# Patient Record
Sex: MALE | Race: BLACK OR AFRICAN AMERICAN | Employment: FULL TIME | URBAN - METROPOLITAN AREA
[De-identification: names, ages, dates, MRNs, and addresses within clinical notes are randomized per-mention and may not be internally consistent; named-entity substitution may affect disease eponyms.]

---

## 2021-09-27 ENCOUNTER — APPOINTMENT (OUTPATIENT)
Dept: GENERAL RADIOLOGY | Age: 46
End: 2021-09-27
Attending: EMERGENCY MEDICINE

## 2021-09-27 ENCOUNTER — HOSPITAL ENCOUNTER (EMERGENCY)
Age: 46
Discharge: HOME | End: 2021-09-27
Attending: EMERGENCY MEDICINE

## 2021-09-27 VITALS
OXYGEN SATURATION: 98 % | TEMPERATURE: 98.1 F | DIASTOLIC BLOOD PRESSURE: 88 MMHG | RESPIRATION RATE: 18 BRPM | HEART RATE: 88 BPM | SYSTOLIC BLOOD PRESSURE: 147 MMHG

## 2021-09-27 DIAGNOSIS — F17.200 SMOKING ADDICTION: ICD-10-CM

## 2021-09-27 DIAGNOSIS — J45.21 MILD INTERMITTENT ASTHMA WITH ACUTE EXACERBATION: Primary | ICD-10-CM

## 2021-09-27 PROCEDURE — 74011000250 HC RX REV CODE- 250: Performed by: EMERGENCY MEDICINE

## 2021-09-27 PROCEDURE — 94640 AIRWAY INHALATION TREATMENT: CPT

## 2021-09-27 PROCEDURE — 71046 X-RAY EXAM CHEST 2 VIEWS: CPT

## 2021-09-27 PROCEDURE — 99282 EMERGENCY DEPT VISIT SF MDM: CPT

## 2021-09-27 RX ORDER — PREDNISONE 10 MG/1
TABLET ORAL
Qty: 21 TABLET | Refills: 0 | Status: SHIPPED | OUTPATIENT
Start: 2021-09-27

## 2021-09-27 RX ORDER — IPRATROPIUM BROMIDE AND ALBUTEROL SULFATE 2.5; .5 MG/3ML; MG/3ML
3 SOLUTION RESPIRATORY (INHALATION)
Qty: 30 NEBULE | Refills: 0 | Status: SHIPPED | OUTPATIENT
Start: 2021-09-27

## 2021-09-27 RX ORDER — ALBUTEROL SULFATE 0.83 MG/ML
5 SOLUTION RESPIRATORY (INHALATION)
Status: COMPLETED | OUTPATIENT
Start: 2021-09-27 | End: 2021-09-27

## 2021-09-27 RX ORDER — ALBUTEROL SULFATE 90 UG/1
2 AEROSOL, METERED RESPIRATORY (INHALATION)
Qty: 1 EACH | Refills: 0 | Status: SHIPPED | OUTPATIENT
Start: 2021-09-27

## 2021-09-27 RX ADMIN — ALBUTEROL SULFATE 5 MG: 2.5 SOLUTION RESPIRATORY (INHALATION) at 04:54

## 2021-09-27 NOTE — Clinical Note
[address]  \[facility] [place] [facility]\"" EMERGENCY DEPT  [address] [facility]  [place] [id number] [phone]    Work/School Note    Date: 9/27/2021    To Whom It May concern:    China Blake was seen and treated today in the emergency room by the following provider(s):  Attending Provider: Unique Fisher MD.      China Blake is excused from work/school on 09/27/21 and 09/28/21. He is medically clear to return to work/school on 9/29/2021.        Sincerely,          Alvin Lal MD

## 2021-09-27 NOTE — ED PROVIDER NOTES
EMERGENCY DEPARTMENT HISTORY AND PHYSICAL EXAM      Date: 9/27/2021  Patient Name: Shaila Murphy    History of Presenting Illness     Chief Complaint   Patient presents with    Wheezing     reports hx of asthma, woke with wheezing. did 2 nebs prior to EMS arrival, had 1 duoneb and 10mg dexamethasone by EMS. reports feeling better. History Provided By: Patient    HPI: Shaila Murphy, 39 y.o. male with PMHx significant for asthma presents to the ED with chief complaint of shortness of breath that woke him up from sleep at about 12:30 AM.  Patient uses Advair, Singulair and has duo nebs at home to help with his asthma. When he woke up this morning he did 2 duo nebs and then laid back down. He felt a little better initially, then got short of breath again. He did a third neb and then decided to call EMS. Denies any chest pain, abdominal pain, nausea, vomiting, diarrhea. Denies any fevers, chills, sore throat or runny nose. He does report a cough that is nonproductive. Patient admits to smoking 3 to 4 cigarettes a day and drinking alcohol occasionally. He denies any drug use. He has had both of his Covid shots Beba Marlin) in June 2021. EMS was called and he received dexamethasone 10 mg and 1 DuoNeb in route. He is feeling better now. PCP: Baldo, MD Alina    No current facility-administered medications on file prior to encounter. Current Outpatient Medications on File Prior to Encounter   Medication Sig Dispense Refill    predniSONE (DELTASONE) 50 mg tablet Take 1 Tab by mouth daily. 3 Tab 0    montelukast (SINGULAIR) 10 mg tablet Take 10 mg by mouth nightly.  mometasone-formoterol (DULERA) 200-5 mcg/actuation HFA inhaler Take 2 Puffs by inhalation two (2) times a day.  albuterol-ipratropium (DUONEB) 2.5 mg-0.5 mg/3 ml nebu 3 mL by Nebulization route every four (4) hours as needed.       albuterol (PROVENTIL HFA, VENTOLIN HFA, PROAIR HFA) 90 mcg/actuation inhaler Take 2 puffs by inhalation every four (4) hours as needed for Wheezing. 1 Inhaler 1       Past History     Past Medical History:  Past Medical History:   Diagnosis Date    Asthma     childhood; bipap; no prior intubation       Past Surgical History:  Past Surgical History:   Procedure Laterality Date    HX APPENDECTOMY      age 15-12       Family History:  Family History   Problem Relation Age of Onset   [facility] Asthma Mother     Kidney Disease Mother        Social History:  Social History     Tobacco Use    Smoking status: Current Some Day Smoker     Packs/day: 0.25    Tobacco comment: trying to quit for past 4 months   Substance Use Topics    Alcohol use: Yes     Alcohol/week: 1.0 standard drinks     Types: 1 Cans of beer per week     Comment: 12 to 24 oz beer daily    Drug use: No       Allergies:  No Known Allergies      Review of Systems   Review of Systems   Constitutional: Negative for chills and fever. HENT: Positive for rhinorrhea and sore throat. Negative for congestion and ear pain. Eyes: Negative for visual disturbance. Respiratory: Positive for cough, chest tightness, shortness of breath and wheezing. Cardiovascular: Negative for chest pain and palpitations. Gastrointestinal: Negative for abdominal pain, diarrhea, nausea and vomiting. Genitourinary: Negative for dysuria, flank pain and hematuria. Musculoskeletal: Negative for back pain, myalgias and neck pain. Skin: Negative for rash and wound. Neurological: Negative for dizziness, syncope, light-headedness and headaches. Psychiatric/Behavioral: Negative for confusion. The patient is nervous/anxious. All other systems reviewed and are negative.         Physical Exam    General appearance - well nourished, well appearing, and in no distress  Eyes - pupils equal and reactive, extraocular eye movements intact  ENT - mucous membranes moist, pharynx normal without lesions  Neck - supple, no significant adenopathy; non-tender to palpation  Chest -scattered expiratory wheezes bilaterally, no rales or rhonchi; non-tender to palpation  Heart - normal rate and regular rhythm, S1 and S2 normal, no murmurs noted  Abdomen - soft, nontender, nondistended, no masses or organomegaly  Musculoskeletal - no joint tenderness, deformity or swelling; normal ROM  Extremities - peripheral pulses normal, no pedal edema  Skin - normal coloration and turgor, no rashes  Neurological - alert, oriented x3, normal speech, no focal findings or movement disorder noted    Diagnostic Study Results     Labs -   No results found for this or any previous visit (from the past 12 hour(s)). Radiologic Studies -   XR CHEST PA LAT   Final Result   Normal chest.        CT Results  (Last 48 hours)    None        CXR Results  (Last 48 hours)               09/27/21 0327  XR CHEST PA LAT Final result    Impression:  Normal chest.       Narrative:  EXAM: XR CHEST PA LAT       INDICATION: asthma       COMPARISON: Chest x-ray 11/7/2018. FINDINGS: PA and lateral radiographs of the chest demonstrate clear lungs. The   cardiac and mediastinal contours and pulmonary vascularity are normal. The bones   and soft tissues are within normal limits. Medical Decision Making   I am the first provider for this patient. I reviewed the vital signs, available nursing notes, past medical history, past surgical history, family history and social history. Vital Signs-Reviewed the patient's vital signs. Patient Vitals for the past 12 hrs:   Temp Pulse Resp BP SpO2   09/27/21 0312 98.1 °F (36.7 °C) 88 18 (!) 172/84 98 %           Records Reviewed: Nursing Notes and Old Medical Records    Provider Notes (Medical Decision Making):   Differential diagnosis: Asthma exacerbation, pneumonia, bronchitis  We'll treat with DuoNeb and obtain chest x-ray. ED Course:   Initial assessment performed.  The patients presenting problems have been discussed, and they are in agreement with the care plan formulated and outlined with them. I have encouraged them to ask questions as they arise throughout their visit. Progress Notes:     Patient is feeling better after nebs and steroids. Will treat with Sterapred Dosepak, patient already has DuoNebs, Singulair, and Advair at home. We will also prescribe albuterol inhaler. Follow-up with PCP. Disposition:  Discharge home    PLAN:  1. Discharge Medication List as of 9/27/2021  6:25 AM      START taking these medications    Details   predniSONE (STERAPRED DS) 10 mg dose pack As directed, Print, Disp-21 Tablet, R-0         CONTINUE these medications which have NOT CHANGED    Details   montelukast (SINGULAIR) 10 mg tablet Take 10 mg by mouth nightly., Historical Med      mometasone-formoterol (DULERA) 200-5 mcg/actuation HFA inhaler Take 2 Puffs by inhalation two (2) times a day., Historical Med      albuterol-ipratropium (DUONEB) 2.5 mg-0.5 mg/3 ml nebu 3 mL by Nebulization route every four (4) hours as needed., Historical Med      albuterol (PROVENTIL HFA, VENTOLIN HFA, PROAIR HFA) 90 mcg/actuation inhaler Take 2 puffs by inhalation every four (4) hours as needed for Wheezing., Print, Disp-1 Inhaler, R-1           2. Follow-up Information     Follow up With Specialties Details Why Contact Info    [facility] EMERGENCY DEPT Emergency Medicine  If symptoms worsen [address] [place] [address]  [phone]        Return to ED if worse     Diagnosis     Clinical Impression:   1. Mild intermittent asthma with acute exacerbation    2.  Smoking addiction